# Patient Record
Sex: MALE | Race: WHITE | NOT HISPANIC OR LATINO | Employment: OTHER | URBAN - NONMETROPOLITAN AREA
[De-identification: names, ages, dates, MRNs, and addresses within clinical notes are randomized per-mention and may not be internally consistent; named-entity substitution may affect disease eponyms.]

---

## 2022-06-13 ENCOUNTER — APPOINTMENT (OUTPATIENT)
Dept: ULTRASOUND IMAGING | Facility: HOSPITAL | Age: 62
End: 2022-06-13
Attending: STUDENT IN AN ORGANIZED HEALTH CARE EDUCATION/TRAINING PROGRAM

## 2022-06-13 ENCOUNTER — APPOINTMENT (OUTPATIENT)
Dept: CT IMAGING | Facility: HOSPITAL | Age: 62
End: 2022-06-13
Attending: STUDENT IN AN ORGANIZED HEALTH CARE EDUCATION/TRAINING PROGRAM

## 2022-06-13 ENCOUNTER — APPOINTMENT (OUTPATIENT)
Dept: GENERAL RADIOLOGY | Facility: HOSPITAL | Age: 62
End: 2022-06-13
Attending: PHYSICIAN ASSISTANT

## 2022-06-13 ENCOUNTER — HOSPITAL ENCOUNTER (EMERGENCY)
Facility: HOSPITAL | Age: 62
Discharge: ANOTHER HOSPITAL | End: 2022-06-13
Attending: PHYSICIAN ASSISTANT | Admitting: PHYSICIAN ASSISTANT

## 2022-06-13 VITALS
OXYGEN SATURATION: 93 % | RESPIRATION RATE: 7 BRPM | SYSTOLIC BLOOD PRESSURE: 119 MMHG | DIASTOLIC BLOOD PRESSURE: 84 MMHG | TEMPERATURE: 96.9 F | HEART RATE: 70 BPM

## 2022-06-13 DIAGNOSIS — R19.8 PERFORATED VISCUS: ICD-10-CM

## 2022-06-13 DIAGNOSIS — A41.9 SEPSIS, DUE TO UNSPECIFIED ORGANISM, UNSPECIFIED WHETHER ACUTE ORGAN DYSFUNCTION PRESENT (H): ICD-10-CM

## 2022-06-13 LAB
ABO/RH(D): NORMAL
ALBUMIN SERPL-MCNC: 2.7 G/DL (ref 3.4–5)
ALP SERPL-CCNC: 65 U/L (ref 40–150)
ALT SERPL W P-5'-P-CCNC: 17 U/L (ref 0–70)
AMMONIA PLAS-SCNC: 10 UMOL/L (ref 10–50)
ANION GAP SERPL CALCULATED.3IONS-SCNC: ABNORMAL MMOL/L
ANTIBODY SCREEN: NEGATIVE
APTT PPP: 34 SECONDS (ref 22–38)
AST SERPL W P-5'-P-CCNC: 12 U/L (ref 0–45)
BASE EXCESS BLDV CALC-SCNC: 33.8 MMOL/L (ref -7.7–1.9)
BASOPHILS # BLD AUTO: 0.1 10E3/UL (ref 0–0.2)
BASOPHILS NFR BLD AUTO: 1 %
BILIRUB SERPL-MCNC: 0.7 MG/DL (ref 0.2–1.3)
BUN SERPL-MCNC: 99 MG/DL (ref 7–30)
CALCIUM SERPL-MCNC: 9 MG/DL (ref 8.5–10.1)
CHLORIDE BLD-SCNC: <50 MMOL/L (ref 94–109)
CO2 SERPL-SCNC: >45 MMOL/L (ref 20–32)
CREAT SERPL-MCNC: 7.98 MG/DL (ref 0.66–1.25)
D DIMER PPP FEU-MCNC: 9.36 UG/ML FEU (ref 0–0.5)
EOSINOPHIL # BLD AUTO: 0 10E3/UL (ref 0–0.7)
EOSINOPHIL NFR BLD AUTO: 0 %
ERYTHROCYTE [DISTWIDTH] IN BLOOD BY AUTOMATED COUNT: 14.9 % (ref 10–15)
ETHANOL SERPL-MCNC: <0.01 G/DL
FLUAV RNA SPEC QL NAA+PROBE: NEGATIVE
FLUBV RNA RESP QL NAA+PROBE: NEGATIVE
GFR SERPL CREATININE-BSD FRML MDRD: 7 ML/MIN/1.73M2
GLUCOSE BLD-MCNC: 113 MG/DL (ref 70–99)
HCO3 BLDV-SCNC: 65 MMOL/L (ref 21–28)
HCT VFR BLD AUTO: 38 % (ref 40–53)
HGB BLD-MCNC: 12.6 G/DL (ref 13.3–17.7)
IMM GRANULOCYTES # BLD: 0.1 10E3/UL
IMM GRANULOCYTES NFR BLD: 1 %
INR PPP: 1.27 (ref 0.85–1.15)
LACTATE SERPL-SCNC: 8.7 MMOL/L (ref 0.7–2)
LIPASE SERPL-CCNC: 66 U/L (ref 73–393)
LYMPHOCYTES # BLD AUTO: 0.4 10E3/UL (ref 0.8–5.3)
LYMPHOCYTES NFR BLD AUTO: 4 %
MAGNESIUM SERPL-MCNC: 3.8 MG/DL (ref 1.6–2.3)
MCH RBC QN AUTO: 27.5 PG (ref 26.5–33)
MCHC RBC AUTO-ENTMCNC: 33.2 G/DL (ref 31.5–36.5)
MCV RBC AUTO: 83 FL (ref 78–100)
MONOCYTES # BLD AUTO: 0.5 10E3/UL (ref 0–1.3)
MONOCYTES NFR BLD AUTO: 5 %
NEUTROPHILS # BLD AUTO: 9.4 10E3/UL (ref 1.6–8.3)
NEUTROPHILS NFR BLD AUTO: 89 %
NRBC # BLD AUTO: 0 10E3/UL
NRBC BLD AUTO-RTO: 0 /100
O2/TOTAL GAS SETTING VFR VENT: 28 %
OXYHGB MFR BLDV: 33 % (ref 70–75)
PCO2 BLDV: 90 MM HG (ref 40–50)
PH BLDV: 7.47 [PH] (ref 7.32–7.43)
PLATELET # BLD AUTO: 245 10E3/UL (ref 150–450)
PO2 BLDV: 25 MM HG (ref 25–47)
POTASSIUM BLD-SCNC: 3.2 MMOL/L (ref 3.4–5.3)
PROCALCITONIN SERPL-MCNC: >160 NG/ML
PROT SERPL-MCNC: 7.7 G/DL (ref 6.8–8.8)
RBC # BLD AUTO: 4.59 10E6/UL (ref 4.4–5.9)
RSV RNA SPEC NAA+PROBE: NEGATIVE
SARS-COV-2 RNA RESP QL NAA+PROBE: NEGATIVE
SODIUM SERPL-SCNC: 119 MMOL/L (ref 133–144)
SPECIMEN EXPIRATION DATE: NORMAL
TROPONIN I SERPL HS-MCNC: 48 NG/L
WBC # BLD AUTO: 10.5 10E3/UL (ref 4–11)

## 2022-06-13 PROCEDURE — 71275 CT ANGIOGRAPHY CHEST: CPT

## 2022-06-13 PROCEDURE — 86850 RBC ANTIBODY SCREEN: CPT | Performed by: PHYSICIAN ASSISTANT

## 2022-06-13 PROCEDURE — 85730 THROMBOPLASTIN TIME PARTIAL: CPT | Performed by: PHYSICIAN ASSISTANT

## 2022-06-13 PROCEDURE — 99285 EMERGENCY DEPT VISIT HI MDM: CPT | Mod: 25

## 2022-06-13 PROCEDURE — 74174 CTA ABD&PLVS W/CONTRAST: CPT

## 2022-06-13 PROCEDURE — 83605 ASSAY OF LACTIC ACID: CPT | Performed by: PHYSICIAN ASSISTANT

## 2022-06-13 PROCEDURE — 82077 ASSAY SPEC XCP UR&BREATH IA: CPT | Performed by: PHYSICIAN ASSISTANT

## 2022-06-13 PROCEDURE — 83735 ASSAY OF MAGNESIUM: CPT | Performed by: PHYSICIAN ASSISTANT

## 2022-06-13 PROCEDURE — 87040 BLOOD CULTURE FOR BACTERIA: CPT | Performed by: STUDENT IN AN ORGANIZED HEALTH CARE EDUCATION/TRAINING PROGRAM

## 2022-06-13 PROCEDURE — 250N000011 HC RX IP 250 OP 636: Performed by: PHYSICIAN ASSISTANT

## 2022-06-13 PROCEDURE — 85025 COMPLETE CBC W/AUTO DIFF WBC: CPT | Performed by: PHYSICIAN ASSISTANT

## 2022-06-13 PROCEDURE — 250N000011 HC RX IP 250 OP 636

## 2022-06-13 PROCEDURE — 96365 THER/PROPH/DIAG IV INF INIT: CPT | Mod: XU

## 2022-06-13 PROCEDURE — 96375 TX/PRO/DX INJ NEW DRUG ADDON: CPT | Mod: XU

## 2022-06-13 PROCEDURE — 82040 ASSAY OF SERUM ALBUMIN: CPT | Performed by: PHYSICIAN ASSISTANT

## 2022-06-13 PROCEDURE — 999N000065 XR CHEST PORT 1 VIEW

## 2022-06-13 PROCEDURE — 99291 CRITICAL CARE FIRST HOUR: CPT | Performed by: PHYSICIAN ASSISTANT

## 2022-06-13 PROCEDURE — 93005 ELECTROCARDIOGRAM TRACING: CPT

## 2022-06-13 PROCEDURE — 84145 PROCALCITONIN (PCT): CPT | Performed by: STUDENT IN AN ORGANIZED HEALTH CARE EDUCATION/TRAINING PROGRAM

## 2022-06-13 PROCEDURE — 80053 COMPREHEN METABOLIC PANEL: CPT | Performed by: PHYSICIAN ASSISTANT

## 2022-06-13 PROCEDURE — 71045 X-RAY EXAM CHEST 1 VIEW: CPT

## 2022-06-13 PROCEDURE — 83690 ASSAY OF LIPASE: CPT | Performed by: PHYSICIAN ASSISTANT

## 2022-06-13 PROCEDURE — 93010 ELECTROCARDIOGRAM REPORT: CPT | Performed by: INTERNAL MEDICINE

## 2022-06-13 PROCEDURE — 85610 PROTHROMBIN TIME: CPT | Performed by: PHYSICIAN ASSISTANT

## 2022-06-13 PROCEDURE — 36415 COLL VENOUS BLD VENIPUNCTURE: CPT | Performed by: STUDENT IN AN ORGANIZED HEALTH CARE EDUCATION/TRAINING PROGRAM

## 2022-06-13 PROCEDURE — 36415 COLL VENOUS BLD VENIPUNCTURE: CPT | Performed by: PHYSICIAN ASSISTANT

## 2022-06-13 PROCEDURE — 87637 SARSCOV2&INF A&B&RSV AMP PRB: CPT | Performed by: STUDENT IN AN ORGANIZED HEALTH CARE EDUCATION/TRAINING PROGRAM

## 2022-06-13 PROCEDURE — 82140 ASSAY OF AMMONIA: CPT | Performed by: PHYSICIAN ASSISTANT

## 2022-06-13 PROCEDURE — 70450 CT HEAD/BRAIN W/O DYE: CPT

## 2022-06-13 PROCEDURE — 85379 FIBRIN DEGRADATION QUANT: CPT | Performed by: STUDENT IN AN ORGANIZED HEALTH CARE EDUCATION/TRAINING PROGRAM

## 2022-06-13 PROCEDURE — C9803 HOPD COVID-19 SPEC COLLECT: HCPCS

## 2022-06-13 PROCEDURE — 70491 CT SOFT TISSUE NECK W/DYE: CPT

## 2022-06-13 PROCEDURE — 258N000003 HC RX IP 258 OP 636: Performed by: PHYSICIAN ASSISTANT

## 2022-06-13 PROCEDURE — 84484 ASSAY OF TROPONIN QUANT: CPT | Performed by: PHYSICIAN ASSISTANT

## 2022-06-13 PROCEDURE — 82805 BLOOD GASES W/O2 SATURATION: CPT | Performed by: STUDENT IN AN ORGANIZED HEALTH CARE EDUCATION/TRAINING PROGRAM

## 2022-06-13 RX ORDER — ONDANSETRON 2 MG/ML
INJECTION INTRAMUSCULAR; INTRAVENOUS
Status: COMPLETED
Start: 2022-06-13 | End: 2022-06-13

## 2022-06-13 RX ORDER — ONDANSETRON 2 MG/ML
4 INJECTION INTRAMUSCULAR; INTRAVENOUS EVERY 30 MIN PRN
Status: DISCONTINUED | OUTPATIENT
Start: 2022-06-13 | End: 2022-06-13 | Stop reason: HOSPADM

## 2022-06-13 RX ORDER — IOPAMIDOL 755 MG/ML
130 INJECTION, SOLUTION INTRAVASCULAR ONCE
Status: COMPLETED | OUTPATIENT
Start: 2022-06-13 | End: 2022-06-13

## 2022-06-13 RX ADMIN — ONDANSETRON 4 MG: 2 INJECTION INTRAMUSCULAR; INTRAVENOUS at 15:14

## 2022-06-13 RX ADMIN — IOPAMIDOL 130 ML: 755 INJECTION, SOLUTION INTRAVENOUS at 15:33

## 2022-06-13 RX ADMIN — TAZOBACTAM SODIUM AND PIPERACILLIN SODIUM 4.5 G: 500; 4 INJECTION, SOLUTION INTRAVENOUS at 15:58

## 2022-06-13 RX ADMIN — VANCOMYCIN HYDROCHLORIDE 2000 MG: 1 INJECTION, POWDER, LYOPHILIZED, FOR SOLUTION INTRAVENOUS at 16:01

## 2022-06-13 NOTE — ED PROVIDER NOTES
History     Chief Complaint   Patient presents with     Loss of Consciousness     Altered Mental Status     HPI  Arnel Guajardo is a 62 year old male who arrives to the emergency department via EMS for a syncopal episode.  Patient has no significant past medical history as he does not doctor regularly.  Does have a past medical history of smoking as well as drinking.  Initially EMS had called for a scene flight for a STEMI however EKG was read on and it did not show this to cancel this.  Patient arrived to the emergency department confused GCS of 14.  Difficult to obtain full HPI and ROS.  Speaking with the patient's mother who has been watching over him states that he has had a decline over the past 6 months he has difficulty swallowing and he has not been eating much as he when he does eat he typically vomits even if it is few small tablespoons.  She states that he has been weak lately and has been in bed for the majority of his days.    Allergies:  Not on File    Problem List:    There are no problems to display for this patient.       Past Medical History:    No past medical history on file.    Past Surgical History:    No past surgical history on file.    Family History:    No family history on file.    Social History:  Marital Status:  Single [1]        Medications:    No current outpatient medications on file.        Review of Systems  unable to obtain full ROS due to decreased level consciousness    Physical Exam   BP: 98/66  Pulse: 85  Temp: 96.9  F (36.1  C)  Resp: 18  SpO2: (!) 87 %      Physical Exam  Vitals: Blood pressure 98/66, pulse of 85, temp of 96.9, respirations of 18 initially was 87% on room air went to 96% on nasal cannula.  General: alert, oriented to person, place, not to time  Head: atraumatic  Eyes: PERRL, corneas clear and conjunctivae clear  Mouth/Throat: no exudates, no erythema, no tonsillar enlargement, structures midline and no hoarseness  Neck: no tenderness, supple and no  adenopathy  Chest/Pulmonary: Slight crackles bilaterally both bases, no chest wall tenderness or deformities, no tachypnea and no cyanosis  Cardiovascular: S1, S2 normal, regular rate and rhythm, no murmur and no pedal edema equal upper extremity pulses equal lower extremity pulses  Abdomen: soft, non-tender, no guarding, no rebound tenderness  Back/Spine:  No tenderness, no step-offs, no obvious deformities.  Musculoskeletal/Extremities: normal extremities, no edema, erythema, tenderness and 5/5 strength in all 4 extremities  Skin: no diaphoresis and skin color normal  Neuro: speech clear and gait stable, good upper extremity strength good lower extremity strength, GCS of 14 patient is slightly confused  Psychiatric: Able to completely assess    ED Course          MDM: Differential diagnosis for this patient is broad includes sepsis, and includes intra-abdominal infection, perforated bowel, ACS, neuro pathologies which include a stroke, as well as a seizure.  Arrhythmia, electrolyte abnormalities, anabolic encephalopathy, and pneumonia.    Upon arrival I worked with Dr. Dimas on this patient he performed a bedside ultrasound of the abdomen showed no abnormalities to the aorta at this time however did appear as if there was free fluid with sediment in his abdomen.  A CT of his chest abdomen pelvis were performed and found a perforated viscus he was also found to have a lactic of 8.7 broad-spectrum antibiotics were started for perforated viscus have reached out and been accepted for this patient at [facility] via general surgery and acute care surgery.  Patient is negative for COVID which is reassuring.  He has been hemodynamically stable he has been found to have hyponatremia at 119 he is got a liter of fluid at this time.  We have switch him to maintenance fluids.  He is being transported now by helicopter.    ED Course as of 06/13/22 1659   Mon Jun 13, 2022   1531 Lactic acid whole blood(!!)  Lactic acid is  8.7.  We will obtain blood cultures.   1542 CBC with platelets differential(!)  CBC shows slight anemia hemoglobin of 12.5 it is normocytic normochromic.  White blood cell count is 10.5.   1542 Blood gas venous and oxyhgb(!)  ABG shows patient in an alkalotic state pH of 7.47 elevated PCO2 as well as a bicarbonate at this time.   1550 Magnesium(!)  Elevated magnesium.   1550 Ethyl Alcohol Level  Ethanol is negative.   1550 Ammonia  Ammonia is reassuring.   1550 Lipase(!)  Lipase is decreased at 66.   1604 CTA Chest Abdomen Pelvis w Contrast  IMPRESSION:   Free intraperitoneal or retroperitoneal gas concerning for  perforation, possibly the sigmoid colon.     Marked distention of the stomach concerning for gastric outlet  obstruction.     Bilateral lower lobe pneumonia, worse on the left. In light of the  other findings, possibly representing sequelae of aspiration.     Urinary bladder wall thickening, likely related to prostate  hypertrophy and nondistention of the bladder. Cystitis however is not  excluded.     Severe colonic diverticulosis. Cannot completely exclude  diverticulitis.   1604 Soft tissue neck CT w contrast  IMPRESSION:   Moderate distention of the proximal esophagus.     No acute abnormality otherwise.     Scattered degenerative changes of the cervical spine.   1604 CT Head w/o Contrast                                                                   IMPRESSION:   No acute intracranial abnormality.  No acute fracture. Normal  examination.      1604 Procalcitonin greater than 160 patient is getting coverage with Vanco and Zosyn.   1618 Comprehensive metabolic panel  Patient has a critical sodium of 119 is received a liter of fluid we are on maintenance fluids at this time.   1628 Asymptomatic Influenza A/B & SARS-CoV2 (COVID-19) Virus PCR Multiplex Nasopharyngeal  Negative for COVID   1628 Procalcitonin(!!): >160.00   1629 XR Chest Port 1 View  Impression:   Free intracranial gas beneath the left  "hemidiaphragm.        Critical Care time:  was 60 minutes for this patient excluding procedures.  The patient has signs of Severe Sepsis        If one the following conditions is present, a 30 mL/kg bolus is recommended as part of the 6 hour bundle (IBW can be used for BMI >30, or document refusal/contraindication):      1.   Initial hypotension  defined as 2 bps < 90 or map < 65 in the 6hrs before or 3hrs after time zero.     2.  Lactate >4.      The patient has signs of Severe Sepsis as evidenced by:    1. 2 SIRS criteria, AND  2. Suspected infection, AND   3. Organ dysfunction: Lactic Acidosis with value >2.0    Time severe sepsis diagnosis confirmed: 1508  06/13/22 as this was the time when Lactate resulted, and the level was > 2.0    3 Hour Severe Sepsis Bundle Completion:    1. Initial Lactic Acid Result:   Recent Labs   Lab Test 06/13/22  1508   LACT 8.7*     2. Blood Cultures before Antibiotics: Yes  3. Broad Spectrum Antibiotics Administered:  yes       Anti-infectives (From admission through now)    Start     Dose/Rate Route Frequency Ordered Stop    06/13/22 1555  vancomycin 2000 mg in 0.9% NaCl 500 ml intermittent infusion 2,000 mg        Note to Pharmacy: DO NOT USE THIS FIELD FOR ADMIN INSTRUCTIONS; INFORMATION DOES NOT SHOW ON MAR. USE THE FIELD ABOVE MARKED ADMIN INSTRUCTIONS    2,000 mg  over 2 Hours Intravenous ONCE 06/13/22 1554      06/13/22 1555  piperacillin-tazobactam (ZOSYN) intermittent infusion 4.5 g        Note to Pharmacy: For [facility], [facility] and [facility]: For Zosyn-naive patients, use the \"Zosyn initial dose + extended infusion\" order panel.    4.5 g  200 mL/hr over 30 Minutes Intravenous ONCE 06/13/22 1554 06/13/22 1631          4. Is initial hypotension present?     Yes. (Definition - 2 SBPs <90, MAP <65, or decrease > 40 from baseline due to infection w/in 3 hrs of each other during the time period of 6 hrs before and 3 hrs after time zero)   Full 30 mL/kg bolus given (see amount below).    BMI " Readings from Last 1 Encounters:   No data found for BMI     30 mL/kg fluids based on weight: Patient actual weight not available.  30 mL/kg fluids based on IBW (must be >= 60 inches tall): Patient ideal weight not available.                      Severe Sepsis reassessment:  1. Repeat Lactic Acid Level within 6 hours of time zero: N/A  2. MAP>65 after initial IVF bolus, will continue to monitor fluid status and vital signs    I attest to having performed a repeat sepsis exam and assessment of perfusion at 1630 and the results demonstrate no change.         Results for orders placed or performed during the hospital encounter of 06/13/22 (from the past 24 hour(s))   CBC with platelets differential    Narrative    The following orders were created for panel order CBC with platelets differential.  Procedure                               Abnormality         Status                     ---------                               -----------         ------                     CBC with platelets and d...[[id number]]  Abnormal            Final result                 Please view results for these tests on the individual orders.   INR   Result Value Ref Range    INR 1.27 (H) 0.85 - 1.15   Partial thromboplastin time   Result Value Ref Range    aPTT 34 22 - 38 Seconds   Comprehensive metabolic panel   Result Value Ref Range    Sodium 119 (LL) 133 - 144 mmol/L    Potassium 3.2 (L) 3.4 - 5.3 mmol/L    Chloride <50 (L) 94 - 109 mmol/L    Carbon Dioxide (CO2) >45 (H) 20 - 32 mmol/L    Anion Gap      Urea Nitrogen 99 (H) 7 - 30 mg/dL    Creatinine 7.98 (H) 0.66 - 1.25 mg/dL    Calcium 9.0 8.5 - 10.1 mg/dL    Glucose 113 (H) 70 - 99 mg/dL    Alkaline Phosphatase 65 40 - 150 U/L    AST 12 0 - 45 U/L    ALT 17 0 - 70 U/L    Protein Total 7.7 6.8 - 8.8 g/dL    Albumin 2.7 (L) 3.4 - 5.0 g/dL    Bilirubin Total 0.7 0.2 - 1.3 mg/dL    GFR Estimate 7 (L) >60 mL/min/1.73m2   Lipase   Result Value Ref Range    Lipase 66 (L) 73 - 393 U/L   Lactic  acid whole blood   Result Value Ref Range    Lactic Acid 8.7 (HH) 0.7 - 2.0 mmol/L   Troponin I   Result Value Ref Range    Troponin I High Sensitivity 48 <79 ng/L   Magnesium   Result Value Ref Range    Magnesium 3.8 (H) 1.6 - 2.3 mg/dL   Ammonia   Result Value Ref Range    Ammonia 10 10 - 50 umol/L   Ethyl Alcohol Level   Result Value Ref Range    Alcohol ethyl <0.01 <=0.01 g/dL   ABO/Rh type and screen    Narrative    The following orders were created for panel order ABO/Rh type and screen.  Procedure                               Abnormality         Status                     ---------                               -----------         ------                     Adult Type and Screen[[id number]]                            Edited Result - FINAL        Please view results for these tests on the individual orders.   Procalcitonin   Result Value Ref Range    Procalcitonin >160.00 (HH) <0.05 ng/mL   CBC with platelets and differential   Result Value Ref Range    WBC Count 10.5 4.0 - 11.0 10e3/uL    RBC Count 4.59 4.40 - 5.90 10e6/uL    Hemoglobin 12.6 (L) 13.3 - 17.7 g/dL    Hematocrit 38.0 (L) 40.0 - 53.0 %    MCV 83 78 - 100 fL    MCH 27.5 26.5 - 33.0 pg    MCHC 33.2 31.5 - 36.5 g/dL    RDW 14.9 10.0 - 15.0 %    Platelet Count 245 150 - 450 10e3/uL    % Neutrophils 89 %    % Lymphocytes 4 %    % Monocytes 5 %    % Eosinophils 0 %    % Basophils 1 %    % Immature Granulocytes 1 %    NRBCs per 100 WBC 0 <1 /100    Absolute Neutrophils 9.4 (H) 1.6 - 8.3 10e3/uL    Absolute Lymphocytes 0.4 (L) 0.8 - 5.3 10e3/uL    Absolute Monocytes 0.5 0.0 - 1.3 10e3/uL    Absolute Eosinophils 0.0 0.0 - 0.7 10e3/uL    Absolute Basophils 0.1 0.0 - 0.2 10e3/uL    Absolute Immature Granulocytes 0.1 <=0.4 10e3/uL    Absolute NRBCs 0.0 10e3/uL   Adult Type and Screen   Result Value Ref Range    ABO/RH(D) O POS     Antibody Screen Negative Negative    SPECIMEN EXPIRATION DATE [id number]    Blood gas venous and oxyhgb   Result Value Ref  Range    pH Venous 7.47 (H) 7.32 - 7.43    pCO2 Venous 90 (H) 40 - 50 mm Hg    pO2 Venous 25 25 - 47 mm Hg    Bicarbonate Venous 65 (H) 21 - 28 mmol/L    FIO2 28     Oxyhemoglobin Venous 33 (L) 70 - 75 %    Base Excess/Deficit (+/-) 33.8 (H) -7.7 - 1.9 mmol/L   Asymptomatic Influenza A/B & SARS-CoV2 (COVID-19) Virus PCR Multiplex Nasopharyngeal    Specimen: Nasopharyngeal; Swab   Result Value Ref Range    Influenza A PCR Negative Negative    Influenza B PCR Negative Negative    RSV PCR Negative Negative    SARS CoV2 PCR Negative Negative    Narrative    Testing was performed using the Xpert Xpress CoV2/Flu/RSV Assay on the Cepheid GeneXpert Instrument. This test should be ordered for the detection of SARS-CoV-2 and influenza viruses in individuals who meet clinical and/or epidemiological criteria. Test performance is unknown in asymptomatic patients. This test is for in vitro diagnostic use under the FDA EUA for laboratories certified under CLIA to perform high or moderate complexity testing. This test has not been FDA cleared or approved. A negative result does not rule out the presence of PCR inhibitors in the specimen or target RNA in concentration below the limit of detection for the assay. If only one viral target is positive but coinfection with multiple targets is suspected, the sample should be re-tested with another FDA cleared, approved, or authorized test, if coinfection would change clinical management. This test was validated by the [facility]. These laboratories are certified under the Clinical  Laboratory Improvement Amendments of 1988 (CLIA-88) as qualified to perform high complexity laboratory testing.   CT Head w/o Contrast    Narrative    PROCEDURE: CT HEAD W/O CONTRAST   6/13/2022 3:45 PM    HISTORY:Male, age,  62 years, , , Mental status change, unknown cause    COMPARISON:None    TECHNIQUE: CT of the brain without contrast.    FINDINGS: Ventricles and sulci normal in size  and shape . Gray and  white matter demonstrate normal differentiation.    There is no evidence of mass, mass effect or midline shift. No  evidence of acute hemorrhage.    The bones are unremarkable. No fracture.       Impression    IMPRESSION:   No acute intracranial abnormality.  No acute fracture. Normal  examination.    JESSIKA DENNIS MD         SYSTEM ID:  [id number]   Soft tissue neck CT w contrast    Narrative    CT SOFT TISSUE NECK W CONTRAST, 6/13/2022 3:46 PM    History: Male, age 62 years; voice changes    Comparison: None.    Technique: CT scan was performed of the neck  after the administration  of intravenous contrast. Sagittal, coronal and axial reconstructions  were reviewed.    FINDINGS:   Visualized portions of the brain are normal.    Muscles of mastication and salivary glands are normal. Piriform and  pharyngeal mucosa is normal.    No evidence of pathologic lymph node enlargement.    Larynx and the thyroid gland are normal. Visualized portions of the  proximal esophagus are moderately dilated. Moderate degenerative  changes seen within the cervical spine. There is bone-on-bone  articulation of the anterior margin of the atlantoaxial joint.      Impression    IMPRESSION:   Moderate distention of the proximal esophagus.    No acute abnormality otherwise.    Scattered degenerative changes of the cervical spine.    JESSIKA DENNIS MD         SYSTEM ID:  [id number]   CTA Chest Abdomen Pelvis w Contrast    Narrative    CTA CHEST ABDOMEN PELVIS W CONTRAST    CLINICAL HISTORY: Male, age 62 years, Abdominal pain, aortic  dissection suspected;    Comparison:  None.    TECHNIQUE:  CT was performed of the chest, abdomen and pelvis  after  the administration of IV  contrast.   Sagittal, coronal, axial and MIP reconstructions were reviewed.     FINDINGS:  Chest CT:   There is a subtle airspace consolidation in the lower lobes, slightly  worse on the left. No pneumothorax.    Esophagus is markedly distended with  fluid.    Heart and great vessels demonstrate no acute abnormality. There is no  evidence of pathologic lymph node enlargement.    Visualized portions of the rib cage demonstrate no acute abnormality.    Abdomen pelvis CT:   Stomach and duodenum: Stomach is markedly distended with fluid,  limiting evaluation of the shunting structures.    Liver: Normal.    Gallbladder: Normal.    Spleen: Normal    Pancreas: Normal.    Adrenal glands: Normal    Kidneys: Normal.    Ureters: Visualized portions are normal.    Urinary bladder: Nondistended. Urinary bladder wall thickening.    Large and small bowel: There are numerous diverticula within the  mesentery. Numerous gas bubbles are seen throughout the mesentery.  Moderate volume of gas is also seen dispersed throughout the  peritoneal cavity and retroperitoneum.    Appendix is not well seen.    There are scattered aphthous chronic calcifications of the abdominal  aorta. No evidence of acute arterial abnormality. Inferior vena cava  is nondistended suggestion volume depletion.    Bony structures demonstrate no acute abnormality. Moderate  degenerative changes are seen within the lower lumbar facet joints.      Impression    IMPRESSION:   Free intraperitoneal or retroperitoneal gas concerning for  perforation, possibly the sigmoid colon.    Marked distention of the stomach concerning for gastric outlet  obstruction.    Bilateral lower lobe pneumonia, worse on the left. In light of the  other findings, possibly representing sequelae of aspiration.    Urinary bladder wall thickening, likely related to prostate  hypertrophy and nondistention of the bladder. Cystitis however is not  excluded.    Severe colonic diverticulosis. Cannot completely exclude  diverticulitis.    JESSIKA DENNIS MD         SYSTEM ID:  [id number]   XR Chest Port 1 View    Narrative    Procedure:XR CHEST PORT 1 VIEW    Clinical history:Male, 62 years, syncope    Technique: Single view was  obtained.    Comparison: None    Findings: The cardiac silhouette is normal. The pulmonary vasculature  is normal.    The lungs are clear. Bony structures are unremarkable. Small volume of  gas is seen beneath the left hemidiaphragm.      Impression    Impression:   Free intracranial gas beneath the left hemidiaphragm.     Clear lungs.    JESSIKA DENNIS MD         SYSTEM ID:  [id number]   XR Chest Port 1 View    Narrative    Procedure:XR CHEST PORT 1 VIEW    Clinical history:Male, 62 years, ng placement    Technique: Single view was obtained.    Comparison: 6/13/2022    Findings: The cardiac silhouette is normal. The pulmonary vasculature  is normal.    The lungs are clear. Bony structures are unremarkable. An NG tube has  been placed. The tip and sidehole extend beneath the desiree, likely in  the mid to distal esophagus. Free air seen beneath the hemidiaphragms.      Impression    Impression:   Nasogastric tube tip extends beneath the desiree, likely in the mid to  distal esophagus.     Free intraperitoneal air beneath the left and right hemidiaphragm.    JESSIKA DENNIS MD         SYSTEM ID:  [id number]       Medications   ondansetron (ZOFRAN) injection 4 mg (4 mg Intravenous Given 6/13/22 1514)   sodium chloride (PF) 0.9% PF flush 90 mL (90 mLs Intracatheter Given 6/13/22 1533)   vancomycin 2000 mg in 0.9% NaCl 500 ml intermittent infusion 2,000 mg (2,000 mg Intravenous New Bag 6/13/22 1601)   iopamidol (ISOVUE-370) solution 130 mL (130 mLs Intravenous Given 6/13/22 1533)   piperacillin-tazobactam (ZOSYN) intermittent infusion 4.5 g (0 g Intravenous Stopped 6/13/22 1631)       Assessments & Plan (with Medical Decision Making)     I have reviewed the nursing notes.    I have reviewed the findings, diagnosis, plan and need for follow up with the patient.      New Prescriptions    No medications on file       Final diagnoses:   Sepsis, due to unspecified organism, unspecified whether acute organ dysfunction present  (H)   Perforated viscus       6/13/2022   [facility]     Ernesto Longoria PA-C  06/13/22 [id number]

## 2022-06-13 NOTE — ED NOTES
DATE:  6/13/2022   TIME OF RECEIPT FROM LAB:  8683  LAB TEST:  lactate  LAB VALUE:  8.7  RESULTS GIVEN WITH READ-BACK TO (PROVIDER):  Ernesto Longoria PA-C  TIME LAB VALUE REPORTED TO PROVIDER:   [id number]

## 2022-06-19 LAB
BACTERIA BLD CULT: NO GROWTH
BACTERIA BLD CULT: NO GROWTH